# Patient Record
(demographics unavailable — no encounter records)

---

## 2025-01-15 NOTE — DISCUSSION/SUMMARY
[de-identified] : All these findings were discussed with the patient.  At this point she is ready for total knee arthroplasty and may schedule it in April after further deliberation.  She has additional social complications and will return in March to further discuss surgery and schedule to date.

## 2025-01-15 NOTE — HISTORY OF PRESENT ILLNESS
[Sitting] : worsened by sitting [Walking] : worsened by walking [NSAIDs] : relieved by nonsteroidal anti-inflammatory drugs [de-identified] : 61 year old female presents to the office today complaining of bilateral knee pain. Patient was seen in the past regarding her bilateral knee pain and advised she would benefit from a total knee replacement. Patient reports a constant pain that is sharp and shooting in nature. She has increased pain with standing, walking, and negotiating stairs. Patient reports taking Diclofenac and Gabapentin, prescribed by her pain management doctor, which she reports gives some relief. Patient has moderate pain at rest and disabling pain with ambulation and at night. Patient can only ambulate indoors due to the pain. She relies on a cane to get around. Patient has difficulty with putting socks and shoes on and reports pain after 30 minutes of sitting. Patient has received cortisone injections and gel injections by her pain management doctor which have stopped giving her any relief. She reports doing PT currently for her back and knee pain. She has a history of bilateral knee arthroscopies. Patient is here today for further evaluation and to discuss her surgical options.  [de-identified] : stairs, carrying

## 2025-01-15 NOTE — PHYSICAL EXAM
[de-identified] : Exam patient walks with severe limp. She has a windswept deformity of bilateral knees. Hip exam is limited by body habitus but does not demonstrate any pain.  Patient has difficulty standing up straight due to back and knee pain.   Left knee demonstrates slight varus alignment which is only partially correctable there is crepitus on patellar grind test.  There is no instability.  There is pain with range of motion.   Right knee demonstrates severe deformity and is aligned in 25 degrees of valgus as measured by the goniometer.  There is significant swelling and pain with range of motion.  There is no instability.  Valgus is only partially correctable. [de-identified] :  Bilateral hip and knee x-rays taken in office today show: Standing AP pelvis and lateral views of bilateral hips were obtained. Bilateral hip x-ray demonstrates mild arthritic changes. Standing AP, lateral and sunrise views of bilateral knees were obtained. Bilateral knee x-ray demonstrates severe osteoarthritis.  There is significant bilateral loss of joint space, subchondral sclerosis, osteophyte formation.  All 3 compartments are affected. Severe valgus deformity of the right knee is consistent with physical exam.

## 2025-01-15 NOTE — DISCUSSION/SUMMARY
[de-identified] : All these findings were discussed with the patient.  At this point she is ready for total knee arthroplasty and may schedule it in April after further deliberation.  She has additional social complications and will return in March to further discuss surgery and schedule to date.

## 2025-01-15 NOTE — HISTORY OF PRESENT ILLNESS
[Sitting] : worsened by sitting [Walking] : worsened by walking [NSAIDs] : relieved by nonsteroidal anti-inflammatory drugs [de-identified] : 61 year old female presents to the office today complaining of bilateral knee pain. Patient was seen in the past regarding her bilateral knee pain and advised she would benefit from a total knee replacement. Patient reports a constant pain that is sharp and shooting in nature. She has increased pain with standing, walking, and negotiating stairs. Patient reports taking Diclofenac and Gabapentin, prescribed by her pain management doctor, which she reports gives some relief. Patient has moderate pain at rest and disabling pain with ambulation and at night. Patient can only ambulate indoors due to the pain. She relies on a cane to get around. Patient has difficulty with putting socks and shoes on and reports pain after 30 minutes of sitting. Patient has received cortisone injections and gel injections by her pain management doctor which have stopped giving her any relief. She reports doing PT currently for her back and knee pain. She has a history of bilateral knee arthroscopies. Patient is here today for further evaluation and to discuss her surgical options.  [de-identified] : stairs, carrying

## 2025-01-15 NOTE — PHYSICAL EXAM
[de-identified] : Exam patient walks with severe limp. She has a windswept deformity of bilateral knees. Hip exam is limited by body habitus but does not demonstrate any pain.  Patient has difficulty standing up straight due to back and knee pain.   Left knee demonstrates slight varus alignment which is only partially correctable there is crepitus on patellar grind test.  There is no instability.  There is pain with range of motion.   Right knee demonstrates severe deformity and is aligned in 25 degrees of valgus as measured by the goniometer.  There is significant swelling and pain with range of motion.  There is no instability.  Valgus is only partially correctable. [de-identified] :  Bilateral hip and knee x-rays taken in office today show: Standing AP pelvis and lateral views of bilateral hips were obtained. Bilateral hip x-ray demonstrates mild arthritic changes. Standing AP, lateral and sunrise views of bilateral knees were obtained. Bilateral knee x-ray demonstrates severe osteoarthritis.  There is significant bilateral loss of joint space, subchondral sclerosis, osteophyte formation.  All 3 compartments are affected. Severe valgus deformity of the right knee is consistent with physical exam.

## 2025-06-22 NOTE — HISTORY OF PRESENT ILLNESS
[Sitting] : worsened by sitting [Walking] : worsened by walking [NSAIDs] : relieved by nonsteroidal anti-inflammatory drugs [de-identified] : 61-year-old female presents to the office today for a follow up for her bilateral knee pain and total knee arthroplasty discussion. Patient was seen in the past regarding her bilateral knee pain and advised she would benefit from a total knee replacement. Patient reports having several health complications since I last saw her. She is seeing a neuro-ophthalmologist and a cardiologist for her conditions. She hopes that by July her health complications will be under control and her social engagements will be free to have surgery. [de-identified] : stairs, carrying

## 2025-06-22 NOTE — PHYSICAL EXAM
[de-identified] : Exam patient walks with severe limp. She has a windswept deformity of bilateral knees. Hip exam is limited by body habitus but does not demonstrate any pain.  Patient has difficulty standing up straight due to back and knee pain.   Left knee demonstrates slight varus alignment which is only partially correctable there is crepitus on patellar grind test.  There is no instability.  There is pain with range of motion.   Right knee demonstrates severe deformity and is aligned in 25 degrees of valgus as measured by the goniometer.  There is significant swelling and pain with range of motion.  There is no instability.  Valgus is only partially correctable. [de-identified] : No imaging taken today.

## 2025-06-22 NOTE — HISTORY OF PRESENT ILLNESS
[Sitting] : worsened by sitting [Walking] : worsened by walking [NSAIDs] : relieved by nonsteroidal anti-inflammatory drugs [de-identified] : 61-year-old female presents to the office today for a follow up for her bilateral knee pain and total knee arthroplasty discussion. Patient was seen in the past regarding her bilateral knee pain and advised she would benefit from a total knee replacement. Patient reports having several health complications since I last saw her. She is seeing a neuro-ophthalmologist and a cardiologist for her conditions. She hopes that by July her health complications will be under control and her social engagements will be free to have surgery. [de-identified] : stairs, carrying

## 2025-06-22 NOTE — PHYSICAL EXAM
[de-identified] : Exam patient walks with severe limp. She has a windswept deformity of bilateral knees. Hip exam is limited by body habitus but does not demonstrate any pain.  Patient has difficulty standing up straight due to back and knee pain.   Left knee demonstrates slight varus alignment which is only partially correctable there is crepitus on patellar grind test.  There is no instability.  There is pain with range of motion.   Right knee demonstrates severe deformity and is aligned in 25 degrees of valgus as measured by the goniometer.  There is significant swelling and pain with range of motion.  There is no instability.  Valgus is only partially correctable. [de-identified] : No imaging taken today.

## 2025-06-22 NOTE — DISCUSSION/SUMMARY
[de-identified] : All these findings were discussed with the patient. Patient is looking to schedule a right total knee arthroplasty in the second half of July. At this point she failed extensive conservative treatment and is ready for total knee arthroplasty.  We discussed total knee arthroplasty in great detail.  We discussed risks, benefits, and alternatives of the procedure such as pain, bleeding, infection, blood clot formation, limb length discrepancy, instability, and need for further surgery.  We discussed prosthetic choice and composition, patient verbalized understanding and participated in this discussion. We discussed pre and postsurgical logistics.  We discussed preoperative medical clearance and optimization.  The patient will require a rolling walker for 2-4 weeks post operatively due to gait instability to help with mobility related ADL's and a commode as they confined to a one level without access to a bathroom. Patient is able to safely use the walker and functional mobility deficit can be sufficiently resolved with use of a walker. Today multiple questions were answered, and patient will be scheduling surgery.   I, Regina Branch, have acted as a scribe and documented the above information for Dr. Reynolds on 06/13/2025. I, Dr. Reynolds, have reviewed and made appropriate additions and edits on the above information on 06/22/2025.

## 2025-06-22 NOTE — DISCUSSION/SUMMARY
[de-identified] : All these findings were discussed with the patient. Patient is looking to schedule a right total knee arthroplasty in the second half of July. At this point she failed extensive conservative treatment and is ready for total knee arthroplasty.  We discussed total knee arthroplasty in great detail.  We discussed risks, benefits, and alternatives of the procedure such as pain, bleeding, infection, blood clot formation, limb length discrepancy, instability, and need for further surgery.  We discussed prosthetic choice and composition, patient verbalized understanding and participated in this discussion. We discussed pre and postsurgical logistics.  We discussed preoperative medical clearance and optimization.  The patient will require a rolling walker for 2-4 weeks post operatively due to gait instability to help with mobility related ADL's and a commode as they confined to a one level without access to a bathroom. Patient is able to safely use the walker and functional mobility deficit can be sufficiently resolved with use of a walker. Today multiple questions were answered, and patient will be scheduling surgery.   I, Regina Branch, have acted as a scribe and documented the above information for Dr. Reynolds on 06/13/2025. I, Dr. Reynolds, have reviewed and made appropriate additions and edits on the above information on 06/22/2025.